# Patient Record
Sex: FEMALE | Race: BLACK OR AFRICAN AMERICAN | Employment: FULL TIME | URBAN - METROPOLITAN AREA
[De-identification: names, ages, dates, MRNs, and addresses within clinical notes are randomized per-mention and may not be internally consistent; named-entity substitution may affect disease eponyms.]

---

## 2020-08-16 ENCOUNTER — APPOINTMENT (OUTPATIENT)
Dept: GENERAL RADIOLOGY | Facility: HOSPITAL | Age: 50
End: 2020-08-16
Attending: EMERGENCY MEDICINE
Payer: OTHER MISCELLANEOUS

## 2020-08-16 ENCOUNTER — HOSPITAL ENCOUNTER (EMERGENCY)
Facility: HOSPITAL | Age: 50
Discharge: HOME | End: 2020-08-16
Attending: EMERGENCY MEDICINE
Payer: OTHER MISCELLANEOUS

## 2020-08-16 VITALS
WEIGHT: 225 LBS | SYSTOLIC BLOOD PRESSURE: 147 MMHG | HEART RATE: 73 BPM | HEIGHT: 65 IN | BODY MASS INDEX: 37.49 KG/M2 | DIASTOLIC BLOOD PRESSURE: 85 MMHG | RESPIRATION RATE: 18 BRPM | TEMPERATURE: 98 F | OXYGEN SATURATION: 100 %

## 2020-08-16 DIAGNOSIS — S90.32XA CONTUSION OF LEFT FOOT, INITIAL ENCOUNTER: Primary | ICD-10-CM

## 2020-08-16 PROCEDURE — 99283 EMERGENCY DEPT VISIT LOW MDM: CPT

## 2020-08-16 PROCEDURE — 73630 X-RAY EXAM OF FOOT: CPT | Performed by: EMERGENCY MEDICINE

## 2020-08-17 NOTE — ED PROVIDER NOTES
Patient Seen in: [facility] AND [facility] Emergency Department    History   Patient presents with: Foot Injury    Stated Complaint: foot injury    HPI    Patient is here after injury to the right foot.   2 days ago a tire rolled over her right foot while she wa No rashes seen. Neurology:  Moving all extremities equally with good coordination. No cranial nerve asymmetry noted. Psychiatric:  Normal affect. Oriented. No unusual behavior. Interacting well.     We will do x-ray exam consistent with contusion